# Patient Record
Sex: FEMALE | Race: WHITE | NOT HISPANIC OR LATINO | Employment: FULL TIME | URBAN - NONMETROPOLITAN AREA
[De-identification: names, ages, dates, MRNs, and addresses within clinical notes are randomized per-mention and may not be internally consistent; named-entity substitution may affect disease eponyms.]

---

## 2022-01-28 ENCOUNTER — OFFICE VISIT (OUTPATIENT)
Dept: ENDOCRINOLOGY | Facility: CLINIC | Age: 47
End: 2022-01-28

## 2022-01-28 ENCOUNTER — LAB (OUTPATIENT)
Dept: LAB | Facility: HOSPITAL | Age: 47
End: 2022-01-28

## 2022-01-28 VITALS
WEIGHT: 133 LBS | BODY MASS INDEX: 22.16 KG/M2 | DIASTOLIC BLOOD PRESSURE: 68 MMHG | OXYGEN SATURATION: 98 % | SYSTOLIC BLOOD PRESSURE: 126 MMHG | HEART RATE: 85 BPM | HEIGHT: 65 IN

## 2022-01-28 DIAGNOSIS — R94.6 ABNORMAL THYROID FUNCTION TEST: Primary | ICD-10-CM

## 2022-01-28 DIAGNOSIS — F41.9 ANXIETY: ICD-10-CM

## 2022-01-28 LAB
ALBUMIN SERPL-MCNC: 5.2 G/DL (ref 3.5–5.2)
ALBUMIN/GLOB SERPL: 1.7 G/DL
ALP SERPL-CCNC: 70 U/L (ref 39–117)
ALT SERPL W P-5'-P-CCNC: 18 U/L (ref 1–33)
ANION GAP SERPL CALCULATED.3IONS-SCNC: 10 MMOL/L (ref 5–15)
AST SERPL-CCNC: 24 U/L (ref 1–32)
BASOPHILS # BLD AUTO: 0.1 10*3/MM3 (ref 0–0.2)
BASOPHILS NFR BLD AUTO: 1.5 % (ref 0–1.5)
BILIRUB SERPL-MCNC: 0.8 MG/DL (ref 0–1.2)
BUN SERPL-MCNC: 11 MG/DL (ref 6–20)
BUN/CREAT SERPL: 15.5 (ref 7–25)
CALCIUM SPEC-SCNC: 10.6 MG/DL (ref 8.6–10.5)
CHLORIDE SERPL-SCNC: 100 MMOL/L (ref 98–107)
CO2 SERPL-SCNC: 29 MMOL/L (ref 22–29)
CREAT SERPL-MCNC: 0.71 MG/DL (ref 0.57–1)
DEPRECATED RDW RBC AUTO: 38.8 FL (ref 37–54)
EOSINOPHIL # BLD AUTO: 0.13 10*3/MM3 (ref 0–0.4)
EOSINOPHIL NFR BLD AUTO: 2 % (ref 0.3–6.2)
ERYTHROCYTE [DISTWIDTH] IN BLOOD BY AUTOMATED COUNT: 12.1 % (ref 12.3–15.4)
GFR SERPL CREATININE-BSD FRML MDRD: 89 ML/MIN/1.73
GLOBULIN UR ELPH-MCNC: 3 GM/DL
GLUCOSE SERPL-MCNC: 96 MG/DL (ref 65–99)
HCT VFR BLD AUTO: 41.5 % (ref 34–46.6)
HGB BLD-MCNC: 14.4 G/DL (ref 12–15.9)
IMM GRANULOCYTES # BLD AUTO: 0.03 10*3/MM3 (ref 0–0.05)
IMM GRANULOCYTES NFR BLD AUTO: 0.5 % (ref 0–0.5)
LYMPHOCYTES # BLD AUTO: 2.41 10*3/MM3 (ref 0.7–3.1)
LYMPHOCYTES NFR BLD AUTO: 36.3 % (ref 19.6–45.3)
MCH RBC QN AUTO: 30.6 PG (ref 26.6–33)
MCHC RBC AUTO-ENTMCNC: 34.7 G/DL (ref 31.5–35.7)
MCV RBC AUTO: 88.3 FL (ref 79–97)
MONOCYTES # BLD AUTO: 0.44 10*3/MM3 (ref 0.1–0.9)
MONOCYTES NFR BLD AUTO: 6.6 % (ref 5–12)
NEUTROPHILS NFR BLD AUTO: 3.53 10*3/MM3 (ref 1.7–7)
NEUTROPHILS NFR BLD AUTO: 53.1 % (ref 42.7–76)
NRBC BLD AUTO-RTO: 0 /100 WBC (ref 0–0.2)
PLATELET # BLD AUTO: 323 10*3/MM3 (ref 140–450)
PMV BLD AUTO: 10.1 FL (ref 6–12)
POTASSIUM SERPL-SCNC: 4.1 MMOL/L (ref 3.5–5.2)
PROT SERPL-MCNC: 8.2 G/DL (ref 6–8.5)
RBC # BLD AUTO: 4.7 10*6/MM3 (ref 3.77–5.28)
SODIUM SERPL-SCNC: 139 MMOL/L (ref 136–145)
T4 FREE SERPL-MCNC: 1.57 NG/DL (ref 0.93–1.7)
TSH SERPL DL<=0.05 MIU/L-ACNC: 0.01 UIU/ML (ref 0.27–4.2)
WBC NRBC COR # BLD: 6.64 10*3/MM3 (ref 3.4–10.8)

## 2022-01-28 PROCEDURE — 83520 IMMUNOASSAY QUANT NOS NONAB: CPT | Performed by: NURSE PRACTITIONER

## 2022-01-28 PROCEDURE — 84445 ASSAY OF TSI GLOBULIN: CPT | Performed by: NURSE PRACTITIONER

## 2022-01-28 PROCEDURE — 84439 ASSAY OF FREE THYROXINE: CPT | Performed by: NURSE PRACTITIONER

## 2022-01-28 PROCEDURE — 36415 COLL VENOUS BLD VENIPUNCTURE: CPT | Performed by: NURSE PRACTITIONER

## 2022-01-28 PROCEDURE — 84480 ASSAY TRIIODOTHYRONINE (T3): CPT | Performed by: NURSE PRACTITIONER

## 2022-01-28 PROCEDURE — 99204 OFFICE O/P NEW MOD 45 MIN: CPT | Performed by: NURSE PRACTITIONER

## 2022-01-28 PROCEDURE — 86376 MICROSOMAL ANTIBODY EACH: CPT | Performed by: NURSE PRACTITIONER

## 2022-01-28 PROCEDURE — 80050 GENERAL HEALTH PANEL: CPT | Performed by: NURSE PRACTITIONER

## 2022-01-28 NOTE — PATIENT INSTRUCTIONS
Hyperthyroidism    Hyperthyroidism is when the thyroid gland is too active (overactive). The thyroid gland is a small gland located in the lower front part of the neck, just in front of the windpipe (trachea). This gland makes hormones that help control how the body uses food for energy (metabolism) as well as how the heart and brain function. These hormones also play a role in keeping your bones strong. When the thyroid is overactive, it produces too much of a hormone called thyroxine.  What are the causes?  This condition may be caused by:  · Graves' disease. This is a disorder in which the body's disease-fighting system (immune system) attacks the thyroid gland. This is the most common cause.  · Inflammation of the thyroid gland.  · A tumor in the thyroid gland.  · Use of certain medicines, including:  ? Prescription thyroid hormone replacement.  ? Herbal supplements that mimic thyroid hormones.  ? Amiodarone therapy.  · Solid or fluid-filled lumps within your thyroid gland (thyroid nodules).  · Taking in a large amount of iodine from foods or medicines.  What increases the risk?  You are more likely to develop this condition if:  · You are female.  · You have a family history of thyroid conditions.  · You smoke tobacco.  · You use a medicine called lithium.  · You take medicines that affect the immune system (immunosuppressants).  What are the signs or symptoms?  Symptoms of this condition include:  · Nervousness.  · Inability to tolerate heat.  · Unexplained weight loss.  · Diarrhea.  · Change in the texture of hair or skin.  · Heart skipping beats or making extra beats.  · Rapid heart rate.  · Loss of menstruation.  · Shaky hands.  · Fatigue.  · Restlessness.  · Sleep problems.  · Enlarged thyroid gland or a lump in the thyroid (nodule).  You may also have symptoms of Graves' disease, which may include:  · Protruding eyes.  · Dry eyes.  · Red or swollen eyes.  · Problems with vision.  How is this  diagnosed?  This condition may be diagnosed based on:  · Your symptoms and medical history.  · A physical exam.  · Blood tests.  · Thyroid ultrasound. This test involves using sound waves to produce images of the thyroid gland.  · A thyroid scan. A radioactive substance is injected into a vein, and images show how much iodine is present in the thyroid.  · Radioactive iodine uptake test (RAIU). A small amount of radioactive iodine is given by mouth to see how much iodine the thyroid absorbs after a certain amount of time.  How is this treated?  Treatment depends on the cause and severity of the condition. Treatment may include:  · Medicines to reduce the amount of thyroid hormone your body makes.  · Radioactive iodine treatment (radioiodine therapy). This involves swallowing a small dose of radioactive iodine, in capsule or liquid form, to kill thyroid cells.  · Surgery to remove part or all of your thyroid gland. You may need to take thyroid hormone replacement medicine for the rest of your life after thyroid surgery.  · Medicines to help manage your symptoms.  Follow these instructions at home:    · Take over-the-counter and prescription medicines only as told by your health care provider.  · Do not use any products that contain nicotine or tobacco, such as cigarettes and e-cigarettes. If you need help quitting, ask your health care provider.  · Follow any instructions from your health care provider about diet. You may be instructed to limit foods that contain iodine.  · Keep all follow-up visits as told by your health care provider. This is important.  ? You will need to have blood tests regularly so that your health care provider can monitor your condition.  Contact a health care provider if:  · Your symptoms do not get better with treatment.  · You have a fever.  · You are taking thyroid hormone replacement medicine and you:  ? Have symptoms of depression.  ? Feel like you are tired all the time.  ? Gain  weight.  Get help right away if:  · You have chest pain.  · You have decreased alertness or a change in your awareness.  · You have abdominal pain.  · You feel dizzy.  · You have a rapid heartbeat.  · You have an irregular heartbeat.  · You have difficulty breathing.  Summary  · The thyroid gland is a small gland located in the lower front part of the neck, just in front of the windpipe (trachea).  · Hyperthyroidism is when the thyroid gland is too active (overactive) and produces too much of a hormone called thyroxine.  · The most common cause is Graves' disease, a disorder in which your immune system attacks the thyroid gland.  · Hyperthyroidism can cause various symptoms, such as unexplained weight loss, nervousness, inability to tolerate heat, or changes in your heartbeat.  · Treatment may include medicine to reduce the amount of thyroid hormone your body makes, radioiodine therapy, surgery, or medicines to manage symptoms.  This information is not intended to replace advice given to you by your health care provider. Make sure you discuss any questions you have with your health care provider.  Document Revised: 11/30/2018 Document Reviewed: 11/28/2018  [employer] Patient Education © 2021 [employer]

## 2022-01-28 NOTE — PROGRESS NOTES
"Chief Complaint  Hyperthyroidism and Thyroid Problem    Subjective          Elodia Taylor presents to [facility] ENDOCRINOLOGY  History of Present Illness         Chief Complaint   Patient presents with   • Hyperthyroidism   • Thyroid Problem       HPI    47 year old female presents for consultation         Reason -- abnormal thyroid function       Duration first abnormal in Dec. 2021    Context --- routine lab work     Timing--- constant     Quality  --not controlled      Severity ---moderate     No results found for: TSH    Component      Latest Ref Rng & Units 10/4/2021   Free T4      0.58 - 1.64 ng/dl 1.12   TSH, High Sensitivity      0.34 - 5.60 uIU/ml 0.01 (L)         Symptoms -- anxiety, hair loss, elevated heart rate       No current medications         Review of Systems - General ROS: negative          Objective   Vital Signs:   /68   Pulse 85   Ht 165.1 cm (65\")   Wt 60.3 kg (133 lb)   SpO2 98%   BMI 22.13 kg/m²     Physical Exam  Constitutional:       Appearance: Normal appearance.   Cardiovascular:      Rate and Rhythm: Regular rhythm.      Heart sounds: Normal heart sounds.   Pulmonary:      Breath sounds: Normal breath sounds.   Musculoskeletal:      Cervical back: Normal range of motion and neck supple.   Neurological:      General: No focal deficit present.      Mental Status: She is alert.   Psychiatric:         Mood and Affect: Mood normal.         Thought Content: Thought content normal.         Judgment: Judgment normal.        Result Review :   The following data was reviewed by: ALEXANDRE Reese on 01/28/2022:  Common labs    Common Labsle 11/16/21   Glucose 89                     Assessment and Plan    Diagnoses and all orders for this visit:    1. Abnormal thyroid function test (Primary)  -     T4, Free  -     TSH  -     T3  -     CBC & Differential  -     Comprehensive Metabolic Panel  -     Thyroid Peroxidase Antibody  -     Thyroid " Stimulating Immunoglobulin  -     Thyrotropin Receptor Antibody    2. Anxiety               Thyroid Health    Abnormal thyroid function test     Component      Latest Ref Rng & Units 10/4/2021   Free T4      0.58 - 1.64 ng/dl 1.12   TSH, High Sensitivity      0.34 - 5.60 uIU/ml 0.01 (L)       reassess today with TSH, Free T4, T3 , TSI and TPO ab      Will call back with results     Discussed treatment for hyperthyroid     Including treatment with methimazole         Anxiety     Does not need any medications         Weight Management:      Patient's Body mass index is 22.13 kg/m². indicating that she is within normal range (BMI 18.5-24.9). No BMI management plan needed..        Preventive Care:     Non smoker       Records from  reviewed form 2021  Thank you for this consultation           Follow Up   Return in about 8 weeks (around 3/25/2022) for Recheck.  Patient was given instructions and counseling regarding her condition or for health maintenance advice. Please see specific information pulled into the AVS if appropriate.         This document has been electronically signed by ALEXANDRE Reese on January 28, 2022 10:44 CST.

## 2022-01-29 LAB — T3 SERPL-MCNC: 115 NG/DL (ref 80–200)

## 2022-01-30 LAB — THYROPEROXIDASE AB SERPL-ACNC: <8 IU/ML (ref 0–34)

## 2022-01-31 LAB
TSH RECEP AB SER-ACNC: 2.55 IU/L (ref 0–1.75)
TSI SER-ACNC: 1.5 IU/L (ref 0–0.55)

## 2022-02-01 ENCOUNTER — TELEPHONE (OUTPATIENT)
Dept: ENDOCRINOLOGY | Facility: CLINIC | Age: 47
End: 2022-02-01

## 2022-02-01 RX ORDER — METHIMAZOLE 5 MG/1
5 TABLET ORAL DAILY
Qty: 30 TABLET | Refills: 11 | Status: SHIPPED | OUTPATIENT
Start: 2022-02-01 | End: 2023-03-27 | Stop reason: SDUPTHER

## 2022-02-02 ENCOUNTER — TELEPHONE (OUTPATIENT)
Dept: ENDOCRINOLOGY | Facility: CLINIC | Age: 47
End: 2022-02-02

## 2022-02-07 ENCOUNTER — TELEPHONE (OUTPATIENT)
Dept: ENDOCRINOLOGY | Facility: CLINIC | Age: 47
End: 2022-02-07

## 2022-02-07 NOTE — TELEPHONE ENCOUNTER
Patient called and wanted to discuss lab results from 1/28/22 and had questions about meds that she just started.     Thanks

## 2022-03-01 ENCOUNTER — TELEPHONE (OUTPATIENT)
Dept: ENDOCRINOLOGY | Facility: CLINIC | Age: 47
End: 2022-03-01

## 2022-03-01 DIAGNOSIS — R94.6 ABNORMAL THYROID FUNCTION TEST: Primary | ICD-10-CM

## 2022-03-07 ENCOUNTER — LAB (OUTPATIENT)
Dept: LAB | Facility: HOSPITAL | Age: 47
End: 2022-03-07

## 2022-03-07 LAB
ALBUMIN SERPL-MCNC: 4.9 G/DL (ref 3.5–5.2)
ALBUMIN/GLOB SERPL: 1.5 G/DL
ALP SERPL-CCNC: 68 U/L (ref 39–117)
ALT SERPL W P-5'-P-CCNC: 20 U/L (ref 1–33)
ANION GAP SERPL CALCULATED.3IONS-SCNC: 10 MMOL/L (ref 5–15)
AST SERPL-CCNC: 26 U/L (ref 1–32)
BASOPHILS # BLD AUTO: 0.1 10*3/MM3 (ref 0–0.2)
BASOPHILS NFR BLD AUTO: 1.9 % (ref 0–1.5)
BILIRUB SERPL-MCNC: 0.8 MG/DL (ref 0–1.2)
BUN SERPL-MCNC: 11 MG/DL (ref 6–20)
BUN/CREAT SERPL: 13.6 (ref 7–25)
CALCIUM SPEC-SCNC: 9.9 MG/DL (ref 8.6–10.5)
CHLORIDE SERPL-SCNC: 98 MMOL/L (ref 98–107)
CO2 SERPL-SCNC: 29 MMOL/L (ref 22–29)
CREAT SERPL-MCNC: 0.81 MG/DL (ref 0.57–1)
DEPRECATED RDW RBC AUTO: 40.2 FL (ref 37–54)
EGFRCR SERPLBLD CKD-EPI 2021: 91.4 ML/MIN/1.73
EOSINOPHIL # BLD AUTO: 0.17 10*3/MM3 (ref 0–0.4)
EOSINOPHIL NFR BLD AUTO: 3.3 % (ref 0.3–6.2)
ERYTHROCYTE [DISTWIDTH] IN BLOOD BY AUTOMATED COUNT: 12.3 % (ref 12.3–15.4)
GLOBULIN UR ELPH-MCNC: 3.3 GM/DL
GLUCOSE SERPL-MCNC: 97 MG/DL (ref 65–99)
HCT VFR BLD AUTO: 40.7 % (ref 34–46.6)
HGB BLD-MCNC: 14 G/DL (ref 12–15.9)
IMM GRANULOCYTES # BLD AUTO: 0.02 10*3/MM3 (ref 0–0.05)
IMM GRANULOCYTES NFR BLD AUTO: 0.4 % (ref 0–0.5)
LYMPHOCYTES # BLD AUTO: 2.06 10*3/MM3 (ref 0.7–3.1)
LYMPHOCYTES NFR BLD AUTO: 39.9 % (ref 19.6–45.3)
MCH RBC QN AUTO: 30.6 PG (ref 26.6–33)
MCHC RBC AUTO-ENTMCNC: 34.4 G/DL (ref 31.5–35.7)
MCV RBC AUTO: 89.1 FL (ref 79–97)
MONOCYTES # BLD AUTO: 0.37 10*3/MM3 (ref 0.1–0.9)
MONOCYTES NFR BLD AUTO: 7.2 % (ref 5–12)
NEUTROPHILS NFR BLD AUTO: 2.44 10*3/MM3 (ref 1.7–7)
NEUTROPHILS NFR BLD AUTO: 47.3 % (ref 42.7–76)
NRBC BLD AUTO-RTO: 0 /100 WBC (ref 0–0.2)
PLATELET # BLD AUTO: 304 10*3/MM3 (ref 140–450)
PMV BLD AUTO: 10.3 FL (ref 6–12)
POTASSIUM SERPL-SCNC: 4.5 MMOL/L (ref 3.5–5.2)
PROT SERPL-MCNC: 8.2 G/DL (ref 6–8.5)
RBC # BLD AUTO: 4.57 10*6/MM3 (ref 3.77–5.28)
SODIUM SERPL-SCNC: 137 MMOL/L (ref 136–145)
T3 SERPL-MCNC: 115 NG/DL (ref 80–200)
T4 FREE SERPL-MCNC: 1.26 NG/DL (ref 0.93–1.7)
TSH SERPL DL<=0.05 MIU/L-ACNC: 0.03 UIU/ML (ref 0.27–4.2)
WBC NRBC COR # BLD: 5.16 10*3/MM3 (ref 3.4–10.8)

## 2022-03-07 PROCEDURE — 84439 ASSAY OF FREE THYROXINE: CPT | Performed by: NURSE PRACTITIONER

## 2022-03-07 PROCEDURE — 84480 ASSAY TRIIODOTHYRONINE (T3): CPT | Performed by: NURSE PRACTITIONER

## 2022-03-07 PROCEDURE — 80050 GENERAL HEALTH PANEL: CPT | Performed by: NURSE PRACTITIONER

## 2022-03-07 PROCEDURE — 36415 COLL VENOUS BLD VENIPUNCTURE: CPT | Performed by: NURSE PRACTITIONER

## 2022-03-25 ENCOUNTER — OFFICE VISIT (OUTPATIENT)
Dept: ENDOCRINOLOGY | Facility: CLINIC | Age: 47
End: 2022-03-25

## 2022-03-25 VITALS
SYSTOLIC BLOOD PRESSURE: 126 MMHG | WEIGHT: 133.3 LBS | HEIGHT: 65 IN | HEART RATE: 87 BPM | OXYGEN SATURATION: 100 % | BODY MASS INDEX: 22.21 KG/M2 | DIASTOLIC BLOOD PRESSURE: 86 MMHG

## 2022-03-25 DIAGNOSIS — E05.90 HYPERTHYROIDISM: Primary | ICD-10-CM

## 2022-03-25 DIAGNOSIS — E05.00 GRAVES DISEASE: ICD-10-CM

## 2022-03-25 PROCEDURE — 99213 OFFICE O/P EST LOW 20 MIN: CPT | Performed by: NURSE PRACTITIONER

## 2022-03-25 NOTE — PROGRESS NOTES
"Chief Complaint  Thyroid Problem    Subjective          Elodia Taylor presents to [facility] ENDOCRINOLOGY  History of Present Illness     In office visit     47 year old female presents for follow up     Reason hyperthyroidism due to Graves      Duration first abnormal in Dec. 2021     Context --- routine lab work      Timing--- constant      Quality  --not controlled        Severity ---moderate     Taking methimazole       Review of Systems - General ROS: negative                  Objective   Vital Signs:   /86   Pulse 87   Ht 165.1 cm (65\")   Wt 60.5 kg (133 lb 4.8 oz)   SpO2 100%   BMI 22.18 kg/m²     Physical Exam  Constitutional:       Appearance: Normal appearance.   Cardiovascular:      Rate and Rhythm: Regular rhythm.      Heart sounds: Normal heart sounds.   Pulmonary:      Breath sounds: Normal breath sounds.   Musculoskeletal:      Cervical back: Normal range of motion and neck supple.   Neurological:      Mental Status: She is alert.        Result Review :   The following data was reviewed by: ALEXANDRE Reese on 03/25/2022:  Common labs    Common Labsle 11/16/21 11/16/21 1/28/22 1/28/22 3/7/22 3/7/22    1002 1002 1046 1046 0950 0950   Glucose    96  97   Glucose  89       BUN    11  11   Creatinine    0.71  0.81   eGFR Non African Am    89     Sodium    139  137   Potassium    4.1  4.5   Chloride    100  98   Calcium    10.6 (A)  9.9   Albumin    5.20  4.90   Total Bilirubin    0.8  0.8   Alkaline Phosphatase    70  68   AST (SGOT)    24  26   ALT (SGPT)    18  20   WBC   6.64  5.16    Hemoglobin   14.4  14.0    Hematocrit   41.5  40.7    Platelets   323  304    Total Cholesterol 153        Triglycerides 55        HDL Cholesterol 65        LDL Cholesterol  77        (A) Abnormal value                      Assessment and Plan    Diagnoses and all orders for this visit:    1. Hyperthyroidism (Primary)  -     CBC & Differential; Future  -     Comprehensive " Metabolic Panel; Future  -     TSH; Future  -     T4, Free; Future  -     T3; Future           Thyroid Health      Hyperthyroidism due to Graves         Component      Latest Ref Rng & Units 1/28/2022   Thyroid Peroxidase Antibody      0 - 34 IU/mL <8   Thyroid Stimulating Immunoglobulin      0.00 - 0.55 IU/L 1.50 (H)   Thyrotropin Receptor Antibody      0.00 - 1.75 IU/L 2.55 (H)       Component      Latest Ref Rng & Units 3/7/2022   Free T4      0.93 - 1.70 ng/dL 1.26   TSH Baseline      0.270 - 4.200 uIU/mL 0.025 (L)   T3, Total      80.0 - 200.0 ng/dl 115.0     Discussed treatment for hyperthyroid      Including treatment with methimazole                      Weight Management:     Patient's Body mass index is 22.18 kg/m². indicating that she is within normal range (BMI 18.5-24.9). No BMI management plan needed..          Preventive Care:      Non smoker                Follow Up   Return in about 3 months (around 6/25/2022) for Recheck.  Patient was given instructions and counseling regarding her condition or for health maintenance advice. Please see specific information pulled into the AVS if appropriate.         This document has been electronically signed by ALEXANDRE Reese on March 25, 2022 11:23 CDT.

## 2022-05-03 DIAGNOSIS — E05.90 HYPERTHYROIDISM: ICD-10-CM

## 2022-05-04 ENCOUNTER — TELEPHONE (OUTPATIENT)
Dept: ENDOCRINOLOGY | Facility: CLINIC | Age: 47
End: 2022-05-04

## 2022-05-04 NOTE — TELEPHONE ENCOUNTER
Pt is wanting lab results from [facility] on 5/2. Those are scanned into media. Please call pt to advise.    Thanks

## 2022-06-29 ENCOUNTER — OFFICE VISIT (OUTPATIENT)
Dept: ENDOCRINOLOGY | Facility: CLINIC | Age: 47
End: 2022-06-29

## 2022-06-29 VITALS
WEIGHT: 136.7 LBS | SYSTOLIC BLOOD PRESSURE: 126 MMHG | BODY MASS INDEX: 22.78 KG/M2 | DIASTOLIC BLOOD PRESSURE: 82 MMHG | HEART RATE: 87 BPM | HEIGHT: 65 IN | OXYGEN SATURATION: 99 %

## 2022-06-29 DIAGNOSIS — E05.00 GRAVES DISEASE: Primary | ICD-10-CM

## 2022-06-29 DIAGNOSIS — E05.90 HYPERTHYROIDISM: ICD-10-CM

## 2022-06-29 PROCEDURE — 99213 OFFICE O/P EST LOW 20 MIN: CPT | Performed by: NURSE PRACTITIONER

## 2022-06-29 NOTE — PROGRESS NOTES
"Chief Complaint  Thyroid Problem    Subjective          Elodia Taylor presents to [facility] ENDOCRINOLOGY  History of Present Illness       In office visit     47 year old female presents for follow up     Reason hyperthyroidism due to Graves      Duration first abnormal in Dec. 2021     Context --- routine lab work      Timing--- constant      Quality  --controlled         Severity ---moderate     Taking methimazole         Review of Systems - General ROS: negative           Objective   Vital Signs:   /82   Pulse 87   Ht 165.1 cm (65\")   Wt 62 kg (136 lb 11.2 oz)   SpO2 99%   BMI 22.75 kg/m²     Physical Exam  Constitutional:       Appearance: Normal appearance.   Cardiovascular:      Rate and Rhythm: Regular rhythm.      Heart sounds: Normal heart sounds.   Pulmonary:      Breath sounds: Normal breath sounds.   Musculoskeletal:      Cervical back: Normal range of motion and neck supple.   Neurological:      Mental Status: She is alert.        Result Review :   The following data was reviewed by: ALEXANDRE Reese on 03/25/2022:  Common labs    Common Labsle 3/7/22 3/7/22 5/2/22 6/13/22    0950 0950     Glucose  97     BUN  11     Creatinine  0.81     Sodium  137     Potassium  4.5     Chloride  98     Calcium  9.9     Albumin  4.90     Total Bilirubin  0.8     Alkaline Phosphatase  68     AST (SGOT)  26     ALT (SGPT)  20     WBC 5.16  5.1 5.1   Hemoglobin 14.0  13.2 13.0   Hematocrit 40.7  39.8 39.0   Platelets 304  277 240                       Assessment and Plan    Diagnoses and all orders for this visit:    1. Graves disease (Primary)  -     CBC & Differential; Future  -     Comprehensive Metabolic Panel; Future  -     TSH; Future  -     T4, Free; Future  -     T3; Future  -     T4, Free; Future  -     TSH; Future  -     T3; Future  -     CBC & Differential; Future  -     Comprehensive Metabolic Panel; Future    2. Hyperthyroidism  -     CBC & Differential; " Future  -     Comprehensive Metabolic Panel; Future  -     TSH; Future  -     T4, Free; Future  -     T3; Future  -     T4, Free; Future  -     TSH; Future  -     T3; Future  -     CBC & Differential; Future  -     Comprehensive Metabolic Panel; Future           Thyroid Health      Hyperthyroidism due to Graves         Component      Latest Ref Rng & Units 1/28/2022   Thyroid Peroxidase Antibody      0 - 34 IU/mL <8   Thyroid Stimulating Immunoglobulin      0.00 - 0.55 IU/L 1.50 (H)   Thyrotropin Receptor Antibody      0.00 - 1.75 IU/L 2.55 (H)         Discussed treatment for hyperthyroid      Including treatment with methimazole         Component      Latest Ref Rng & Units 6/13/2022   TSH, High Sensitivity      0.55 - 4.78 uIU/ML 3.16       T3 -  0.92          Taking 2.5 mg methimazole           Weight Management:     Patient's Body mass index is 22.75 kg/m². indicating that she is within normal range (BMI 18.5-24.9). No BMI management plan needed..          Preventive Care:      Non smoker                Follow Up   Return in about 3 months (around 9/29/2022) for Recheck.  Patient was given instructions and counseling regarding her condition or for health maintenance advice. Please see specific information pulled into the AVS if appropriate.         This document has been electronically signed by ALEXANDRE Reese on June 29, 2022 08:59 CDT.

## 2022-10-03 ENCOUNTER — LAB (OUTPATIENT)
Dept: LAB | Facility: HOSPITAL | Age: 47
End: 2022-10-03

## 2022-10-03 DIAGNOSIS — E05.00 GRAVES DISEASE: ICD-10-CM

## 2022-10-03 DIAGNOSIS — E05.90 HYPERTHYROIDISM: ICD-10-CM

## 2022-10-03 LAB
ALBUMIN SERPL-MCNC: 4.6 G/DL (ref 3.5–5.2)
ALBUMIN/GLOB SERPL: 1.5 G/DL
ALP SERPL-CCNC: 68 U/L (ref 39–117)
ALT SERPL W P-5'-P-CCNC: 15 U/L (ref 1–33)
ANION GAP SERPL CALCULATED.3IONS-SCNC: 9 MMOL/L (ref 5–15)
AST SERPL-CCNC: 21 U/L (ref 1–32)
BASOPHILS # BLD AUTO: 0.08 10*3/MM3 (ref 0–0.2)
BASOPHILS NFR BLD AUTO: 1.5 % (ref 0–1.5)
BILIRUB SERPL-MCNC: 0.5 MG/DL (ref 0–1.2)
BUN SERPL-MCNC: 12 MG/DL (ref 6–20)
BUN/CREAT SERPL: 14.8 (ref 7–25)
CALCIUM SPEC-SCNC: 9.5 MG/DL (ref 8.6–10.5)
CHLORIDE SERPL-SCNC: 101 MMOL/L (ref 98–107)
CO2 SERPL-SCNC: 31 MMOL/L (ref 22–29)
CREAT SERPL-MCNC: 0.81 MG/DL (ref 0.57–1)
DEPRECATED RDW RBC AUTO: 40.6 FL (ref 37–54)
EGFRCR SERPLBLD CKD-EPI 2021: 91.4 ML/MIN/1.73
EOSINOPHIL # BLD AUTO: 0.19 10*3/MM3 (ref 0–0.4)
EOSINOPHIL NFR BLD AUTO: 3.5 % (ref 0.3–6.2)
ERYTHROCYTE [DISTWIDTH] IN BLOOD BY AUTOMATED COUNT: 12.1 % (ref 12.3–15.4)
GLOBULIN UR ELPH-MCNC: 3.1 GM/DL
GLUCOSE SERPL-MCNC: 55 MG/DL (ref 65–99)
HCT VFR BLD AUTO: 40.1 % (ref 34–46.6)
HGB BLD-MCNC: 13.4 G/DL (ref 12–15.9)
IMM GRANULOCYTES # BLD AUTO: 0.02 10*3/MM3 (ref 0–0.05)
IMM GRANULOCYTES NFR BLD AUTO: 0.4 % (ref 0–0.5)
LYMPHOCYTES # BLD AUTO: 2.21 10*3/MM3 (ref 0.7–3.1)
LYMPHOCYTES NFR BLD AUTO: 40.3 % (ref 19.6–45.3)
MCH RBC QN AUTO: 30.6 PG (ref 26.6–33)
MCHC RBC AUTO-ENTMCNC: 33.4 G/DL (ref 31.5–35.7)
MCV RBC AUTO: 91.6 FL (ref 79–97)
MONOCYTES # BLD AUTO: 0.4 10*3/MM3 (ref 0.1–0.9)
MONOCYTES NFR BLD AUTO: 7.3 % (ref 5–12)
NEUTROPHILS NFR BLD AUTO: 2.58 10*3/MM3 (ref 1.7–7)
NEUTROPHILS NFR BLD AUTO: 47 % (ref 42.7–76)
NRBC BLD AUTO-RTO: 0 /100 WBC (ref 0–0.2)
PLATELET # BLD AUTO: 273 10*3/MM3 (ref 140–450)
PMV BLD AUTO: 9.9 FL (ref 6–12)
POTASSIUM SERPL-SCNC: 4.2 MMOL/L (ref 3.5–5.2)
PROT SERPL-MCNC: 7.7 G/DL (ref 6–8.5)
RBC # BLD AUTO: 4.38 10*6/MM3 (ref 3.77–5.28)
SODIUM SERPL-SCNC: 141 MMOL/L (ref 136–145)
T3 SERPL-MCNC: 90.8 NG/DL (ref 80–200)
T4 FREE SERPL-MCNC: 1.11 NG/DL (ref 0.93–1.7)
TSH SERPL DL<=0.05 MIU/L-ACNC: 3.54 UIU/ML (ref 0.27–4.2)
WBC NRBC COR # BLD: 5.48 10*3/MM3 (ref 3.4–10.8)

## 2022-10-03 PROCEDURE — 84439 ASSAY OF FREE THYROXINE: CPT

## 2022-10-03 PROCEDURE — 36415 COLL VENOUS BLD VENIPUNCTURE: CPT

## 2022-10-03 PROCEDURE — 84480 ASSAY TRIIODOTHYRONINE (T3): CPT

## 2022-10-03 PROCEDURE — 80050 GENERAL HEALTH PANEL: CPT

## 2022-10-04 ENCOUNTER — TELEPHONE (OUTPATIENT)
Dept: ENDOCRINOLOGY | Facility: CLINIC | Age: 47
End: 2022-10-04

## 2022-10-04 NOTE — TELEPHONE ENCOUNTER
Patient called, wanting to speak to a nurse concerning her recent labs.    Her callback number is [phone].    Thanks

## 2022-10-06 ENCOUNTER — OFFICE VISIT (OUTPATIENT)
Dept: ENDOCRINOLOGY | Facility: CLINIC | Age: 47
End: 2022-10-06

## 2022-10-06 VITALS
DIASTOLIC BLOOD PRESSURE: 66 MMHG | RESPIRATION RATE: 18 BRPM | HEIGHT: 65 IN | OXYGEN SATURATION: 99 % | SYSTOLIC BLOOD PRESSURE: 122 MMHG | BODY MASS INDEX: 22.23 KG/M2 | HEART RATE: 68 BPM | WEIGHT: 133.4 LBS

## 2022-10-06 DIAGNOSIS — E05.90 HYPERTHYROIDISM: Primary | ICD-10-CM

## 2022-10-06 DIAGNOSIS — F41.9 ANXIETY: ICD-10-CM

## 2022-10-06 DIAGNOSIS — E05.00 GRAVES DISEASE: ICD-10-CM

## 2022-10-06 PROCEDURE — 99213 OFFICE O/P EST LOW 20 MIN: CPT | Performed by: NURSE PRACTITIONER

## 2022-10-06 RX ORDER — DIPHENOXYLATE HYDROCHLORIDE AND ATROPINE SULFATE 2.5; .025 MG/1; MG/1
1 TABLET ORAL DAILY
COMMUNITY

## 2022-10-06 NOTE — PROGRESS NOTES
"Chief Complaint  Follow-up and thyroid (3 mo)    Subjective          Elodia Taylor presents to [facility] ENDOCRINOLOGY  Thyroid Problem         In office visit     47 year old female presents for follow up     Reason hyperthyroidism due to Graves      Duration first abnormal in Dec. 2021     Context --- routine lab work      Timing--- constant      Quality  --controlled         Severity ---moderate     Taking methimazole     Symptoms none            Review of Systems - General ROS: negative      Objective   Vital Signs:   /66   Pulse 68   Resp 18   Ht 165.1 cm (65\")   Wt 60.5 kg (133 lb 6.4 oz)   SpO2 99%   BMI 22.20 kg/m²     Physical Exam  Constitutional:       Appearance: Normal appearance.   Cardiovascular:      Rate and Rhythm: Regular rhythm.      Heart sounds: Normal heart sounds.   Pulmonary:      Breath sounds: Normal breath sounds.   Musculoskeletal:      Cervical back: Normal range of motion and neck supple.   Neurological:      Mental Status: She is alert.        Result Review :   The following data was reviewed by: ALEXANDRE Reese on 03/25/2022:  Common labs    Common Labs 5/2/22 6/13/22 10/3/22 10/3/22      0849 0849   Glucose    55 (A)   BUN    12   Creatinine    0.81   Sodium    141   Potassium    4.2   Chloride    101   Calcium    9.5   Albumin    4.60   Total Bilirubin    0.5   Alkaline Phosphatase    68   AST (SGOT)    21   ALT (SGPT)    15   WBC 5.1 5.1 5.48    Hemoglobin 13.2 13.0 13.4    Hematocrit 39.8 39.0 40.1    Platelets 277 240 273    (A) Abnormal value                        Assessment and Plan    Diagnoses and all orders for this visit:    1. Hyperthyroidism (Primary)  -     CBC & Differential; Future  -     Comprehensive Metabolic Panel; Future  -     TSH; Future  -     T4, Free; Future  -     T3; Future    2. Graves disease  -     CBC & Differential; Future  -     Comprehensive Metabolic Panel; Future  -     TSH; Future  -     T4, " Free; Future  -     T3; Future    3. Anxiety           Thyroid Health      Hyperthyroidism due to Graves         Component      Latest Ref Rng & Units 1/28/2022   Thyroid Peroxidase Antibody      0 - 34 IU/mL <8   Thyroid Stimulating Immunoglobulin      0.00 - 0.55 IU/L 1.50 (H)   Thyrotropin Receptor Antibody      0.00 - 1.75 IU/L 2.55 (H)         Discussed treatment for hyperthyroid      Including treatment with methimazole         Component      Latest Ref Rng & Units 6/13/2022   TSH, High Sensitivity      0.55 - 4.78 uIU/ML 3.16        T3 -  0.92          Taking 2.5 mg methimazole         Lab Results   Component Value Date    TSH 3.540 10/03/2022     Component      Latest Ref Rng & Units 10/3/2022   Free T4      0.93 - 1.70 ng/dL 1.11   T3, Total      80.0 - 200.0 ng/dl 90.8             Weight Management:     Patient's Body mass index is 22.2 kg/m². indicating that she is within normal range (BMI 18.5-24.9). No BMI management plan needed..          Preventive Care:      Non smoker        Anxiety     States improved             Follow Up   No follow-ups on file.  Patient was given instructions and counseling regarding her condition or for health maintenance advice. Please see specific information pulled into the AVS if appropriate.         This document has been electronically signed by ALEXANDRE Reese on October 6, 2022 09:18 CDT.

## 2023-01-24 ENCOUNTER — OFFICE VISIT (OUTPATIENT)
Dept: ENDOCRINOLOGY | Facility: CLINIC | Age: 48
End: 2023-01-24
Payer: COMMERCIAL

## 2023-01-24 VITALS
HEIGHT: 65 IN | HEART RATE: 51 BPM | WEIGHT: 136.3 LBS | OXYGEN SATURATION: 99 % | DIASTOLIC BLOOD PRESSURE: 90 MMHG | SYSTOLIC BLOOD PRESSURE: 130 MMHG | BODY MASS INDEX: 22.71 KG/M2

## 2023-01-24 DIAGNOSIS — E55.9 VITAMIN D DEFICIENCY: ICD-10-CM

## 2023-01-24 DIAGNOSIS — E05.90 HYPERTHYROIDISM: Primary | ICD-10-CM

## 2023-01-24 DIAGNOSIS — F41.9 ANXIETY: ICD-10-CM

## 2023-01-24 DIAGNOSIS — E05.00 GRAVES DISEASE: ICD-10-CM

## 2023-01-24 PROCEDURE — 99214 OFFICE O/P EST MOD 30 MIN: CPT | Performed by: NURSE PRACTITIONER

## 2023-01-24 NOTE — PROGRESS NOTES
"Chief Complaint  Hypothyroidism and Graves' Disease    Subjective          Elodia Taylor presents to [facility] ENDOCRINOLOGY  Thyroid Problem    Hypothyroidism    Graves' Disease         In office visit     48 year old female presents for follow up       Reason hyperthyroidism due to Graves      Duration first abnormal in Dec. 2021     Context --- routine lab work      Timing--- constant      Quality  --controlled         Severity ---moderate     Taking methimazole                  Review of Systems - General ROS: negative      Objective   Vital Signs:   /90   Pulse 51   Ht 165.1 cm (65\")   Wt 61.8 kg (136 lb 4.8 oz)   SpO2 99%   BMI 22.68 kg/m²     Physical Exam  Constitutional:       Appearance: Normal appearance.   Cardiovascular:      Rate and Rhythm: Regular rhythm.      Heart sounds: Normal heart sounds.   Pulmonary:      Breath sounds: Normal breath sounds.   Musculoskeletal:      Cervical back: Normal range of motion and neck supple.   Neurological:      Mental Status: She is alert.        Result Review :   The following data was reviewed by: ALEXANDRE Reese on 03/25/2022:  Common labs    Common Labs 10/3/22 10/3/22 11/16/22 11/16/22 1/16/23    0849 0849 1028 1028    Glucose  55 (A)      Glucose    93    BUN  12      Creatinine  0.81      Sodium  141      Potassium  4.2      Chloride  101      Calcium  9.5      Albumin  4.60      Total Bilirubin  0.5      Alkaline Phosphatase  68      AST (SGOT)  21      ALT (SGPT)  15      WBC 5.48    5.6   Hemoglobin 13.4    13.4   Hematocrit 40.1    40.4   Platelets 273    278   Total Cholesterol   184     Triglycerides   61     HDL Cholesterol   71     LDL Cholesterol    101     (A) Abnormal value                        Assessment and Plan    Diagnoses and all orders for this visit:    1. Hyperthyroidism (Primary)  -     CBC & Differential; Future  -     Comprehensive Metabolic Panel; Future  -     TSH; Future  -     T4, " Free; Future  -     T3; Future  -     Vitamin D,25-Hydroxy; Future  -     Vitamin B12; Future    2. Graves disease  -     CBC & Differential; Future  -     Comprehensive Metabolic Panel; Future  -     TSH; Future  -     T4, Free; Future  -     T3; Future  -     Vitamin D,25-Hydroxy; Future  -     Vitamin B12; Future    3. Anxiety  -     CBC & Differential; Future  -     Comprehensive Metabolic Panel; Future  -     TSH; Future  -     T4, Free; Future  -     T3; Future  -     Vitamin D,25-Hydroxy; Future  -     Vitamin B12; Future    4. Vitamin D deficiency  -     CBC & Differential; Future  -     Comprehensive Metabolic Panel; Future  -     TSH; Future  -     T4, Free; Future  -     T3; Future  -     Vitamin D,25-Hydroxy; Future  -     Vitamin B12; Future           Thyroid Health      Hyperthyroidism due to Graves         Component      Latest Ref Rng & Units 1/28/2022   Thyroid Peroxidase Antibody      0 - 34 IU/mL <8   Thyroid Stimulating Immunoglobulin      0.00 - 0.55 IU/L 1.50 (H)   Thyrotropin Receptor Antibody      0.00 - 1.75 IU/L 2.55 (H)         Discussed treatment for hyperthyroid      Including treatment with methimazole         Component      Latest Ref Rng & Units 6/13/2022   TSH, High Sensitivity      0.55 - 4.78 uIU/ML 3.16        T3 -  0.92          Taking 2.5 mg methimazole            Component      Latest Ref Rng & Units 1/16/2023   TSH, High Sensitivity      0.55 - 4.78 uIU/ML 2.19        Weight Management:     Patient's Body mass index is 22.68 kg/m². indicating that she is within normal range (BMI 18.5-24.9). No BMI management plan needed..          Preventive Care:      Non smoker        Anxiety       Improved       Bone health     Vitamin d def.                 Follow Up   No follow-ups on file.  Patient was given instructions and counseling regarding her condition or for health maintenance advice. Please see specific information pulled into the AVS if appropriate.         This document has  been electronically signed by ALEXANDRE Reese on January 24, 2023 14:00 CST.

## 2023-03-27 DIAGNOSIS — E05.90 HYPERTHYROIDISM: Primary | ICD-10-CM

## 2023-03-27 RX ORDER — METHIMAZOLE 5 MG/1
5 TABLET ORAL DAILY
Qty: 30 TABLET | Refills: 11 | Status: SHIPPED | OUTPATIENT
Start: 2023-03-27 | End: 2023-03-28 | Stop reason: SDUPTHER

## 2023-03-28 DIAGNOSIS — E05.90 HYPERTHYROIDISM: ICD-10-CM

## 2023-03-28 RX ORDER — METHIMAZOLE 5 MG/1
5 TABLET ORAL DAILY
Qty: 30 TABLET | Refills: 11 | Status: SHIPPED | OUTPATIENT
Start: 2023-03-28 | End: 2024-03-27

## 2023-05-09 ENCOUNTER — OFFICE VISIT (OUTPATIENT)
Dept: ENDOCRINOLOGY | Facility: CLINIC | Age: 48
End: 2023-05-09
Payer: COMMERCIAL

## 2023-05-09 VITALS
HEIGHT: 65 IN | WEIGHT: 137 LBS | OXYGEN SATURATION: 99 % | HEART RATE: 49 BPM | SYSTOLIC BLOOD PRESSURE: 130 MMHG | BODY MASS INDEX: 22.82 KG/M2 | DIASTOLIC BLOOD PRESSURE: 84 MMHG

## 2023-05-09 DIAGNOSIS — E05.90 HYPERTHYROIDISM: Primary | ICD-10-CM

## 2023-05-09 DIAGNOSIS — E05.00 GRAVES DISEASE: ICD-10-CM

## 2023-05-09 DIAGNOSIS — E55.9 VITAMIN D DEFICIENCY: ICD-10-CM

## 2023-05-09 PROCEDURE — 99214 OFFICE O/P EST MOD 30 MIN: CPT | Performed by: NURSE PRACTITIONER

## 2023-05-09 NOTE — PROGRESS NOTES
"Chief Complaint  Hyperthyroidism and Graves' Disease    Subjective          Elodia Taylor presents to [facility] ENDOCRINOLOGY  Hypothyroidism    Graves' Disease    Thyroid Problem    Hyperthyroidism         In office visit     48 year old female presents for follow up     Hyperthyroidism due to Graves       Diagnosed in Dec. 2021     Timing constant     Quality controlled    Severity moderate          Taking methimazole         Anxiety improved          Review of Systems - General ROS: negative      Objective   Vital Signs:   /84   Pulse (!) 49   Ht 165.1 cm (65\")   Wt 62.1 kg (137 lb)   SpO2 99%   BMI 22.80 kg/m²     Physical Exam  Constitutional:       Appearance: Normal appearance.   Cardiovascular:      Rate and Rhythm: Regular rhythm.      Heart sounds: Normal heart sounds.   Pulmonary:      Breath sounds: Normal breath sounds.   Musculoskeletal:      Cervical back: Normal range of motion and neck supple.   Neurological:      Mental Status: She is alert.        Result Review :   The following data was reviewed by: ALEXANDRE Reese on 03/25/2022:  Common labs        11/16/2022    09:28 1/16/2023    12:31 4/20/2023    11:24   Common Labs   Glucose 93          WBC  5.6      5.6        Hemoglobin  13.4      13.3        Hematocrit  40.4      39.5        Platelets  278      293        Total Cholesterol 184          Triglycerides 61          HDL Cholesterol 71          LDL Cholesterol  101              This result is from an external source.                 Assessment and Plan    Diagnoses and all orders for this visit:    1. Hyperthyroidism (Primary)  -     CBC & Differential; Future  -     Comprehensive Metabolic Panel; Future  -     TSH; Future  -     Vitamin B12; Future  -     Vitamin D,25-Hydroxy; Future  -     T4, Free; Future  -     T3; Future  -     Thyroid Stimulating Immunoglobulin; Future  -     Thyrotropin Receptor Antibody; Future    2. Graves disease  -   "   CBC & Differential; Future  -     Comprehensive Metabolic Panel; Future  -     TSH; Future  -     Vitamin B12; Future  -     Vitamin D,25-Hydroxy; Future  -     T4, Free; Future  -     T3; Future  -     Thyroid Stimulating Immunoglobulin; Future  -     Thyrotropin Receptor Antibody; Future    3. Vitamin D deficiency  -     CBC & Differential; Future  -     Comprehensive Metabolic Panel; Future  -     TSH; Future  -     Vitamin B12; Future  -     Vitamin D,25-Hydroxy; Future  -     T4, Free; Future  -     T3; Future  -     Thyroid Stimulating Immunoglobulin; Future  -     Thyrotropin Receptor Antibody; Future           Thyroid Health      Hyperthyroidism due to Graves         Component      Latest Ref Rng & Units 1/28/2022   Thyroid Peroxidase Antibody      0 - 34 IU/mL <8   Thyroid Stimulating Immunoglobulin      0.00 - 0.55 IU/L 1.50 (H)   Thyrotropin Receptor Antibody      0.00 - 1.75 IU/L 2.55 (H)         Discussed treatment for hyperthyroid      Including treatment with methimazole      Component      Latest Ref Rng 4/20/2023   TSH, High Sensitivity      0.55 - 4.78 uIU/ML 2.91 (E)              Component      Latest Ref Rng 4/20/2023   Free T4      0.80 - 1.76 NG/DL 1.17 (E)      (E) External lab result           Taking 2.5 mg methimazole                   Preventive Care:      Non smoker        Anxiety       Improved                 Bone health     Vitamin d def.     Taking supplement                 Follow Up   No follow-ups on file.  Patient was given instructions and counseling regarding her condition or for health maintenance advice. Please see specific information pulled into the AVS if appropriate.         This document has been electronically signed by ALEXANDRE Reese on May 9, 2023 09:12 CDT.

## 2023-08-17 ENCOUNTER — OFFICE VISIT (OUTPATIENT)
Dept: ENDOCRINOLOGY | Facility: CLINIC | Age: 48
End: 2023-08-17
Payer: COMMERCIAL

## 2023-08-17 VITALS
BODY MASS INDEX: 22.71 KG/M2 | HEART RATE: 54 BPM | OXYGEN SATURATION: 99 % | SYSTOLIC BLOOD PRESSURE: 120 MMHG | DIASTOLIC BLOOD PRESSURE: 82 MMHG | HEIGHT: 65 IN | WEIGHT: 136.3 LBS

## 2023-08-17 DIAGNOSIS — F41.9 ANXIETY: ICD-10-CM

## 2023-08-17 DIAGNOSIS — E05.00 GRAVES DISEASE: Primary | ICD-10-CM

## 2023-08-17 DIAGNOSIS — E05.90 HYPERTHYROIDISM: ICD-10-CM

## 2023-08-17 DIAGNOSIS — E55.9 VITAMIN D DEFICIENCY: ICD-10-CM

## 2023-08-17 NOTE — PROGRESS NOTES
"Chief Complaint  Hyperthyroidism    Subjective          Elodia Taylor presents to [facility] ENDOCRINOLOGY  Hyperthyroidism    Graves' Disease    Hypothyroidism    Thyroid Problem       In office visit     48 year old female presents for follow up     Hyperthyroidism due to Graves       Diagnosed in Dec. 2021     Timing constant     Quality controlled    Severity moderate          Taking methimazole         Anxiety improved     Review of Systems - General ROS: negative          Objective   Vital Signs:   /82   Pulse 54   Ht 165.1 cm (65\")   Wt 61.8 kg (136 lb 4.8 oz)   SpO2 99%   BMI 22.68 kg/mý     Physical Exam  Constitutional:       Appearance: Normal appearance.   Cardiovascular:      Rate and Rhythm: Regular rhythm.      Heart sounds: Normal heart sounds.   Pulmonary:      Breath sounds: Normal breath sounds.   Musculoskeletal:      Cervical back: Normal range of motion and neck supple.   Neurological:      Mental Status: She is alert.      Result Review :   The following data was reviewed by: ALEXANDRE Reese on 03/25/2022:  Common labs          1/16/2023    12:31 4/20/2023    11:24 8/9/2023    09:44   Common Labs   WBC 5.6     5.6     5.5       Hemoglobin 13.4     13.3     13.3       Hematocrit 40.4     39.5     39.6       Platelets 278     293     280          Details          This result is from an external source.                       Assessment and Plan    Diagnoses and all orders for this visit:    1. Graves disease (Primary)  -     TSH; Standing  -     T4, Free; Standing  -     T3; Standing  -     CBC & Differential; Standing  -     Comprehensive Metabolic Panel; Standing  -     TSH; Future  -     T4, Free; Future  -     T3; Future    2. Hyperthyroidism  -     TSH; Standing  -     T4, Free; Standing  -     T3; Standing  -     CBC & Differential; Standing  -     Comprehensive Metabolic Panel; Standing  -     TSH; Future  -     T4, Free; Future  -     T3; " Future    3. Anxiety  -     TSH; Standing  -     T4, Free; Standing  -     T3; Standing  -     CBC & Differential; Standing  -     Comprehensive Metabolic Panel; Standing  -     TSH; Future  -     T4, Free; Future  -     T3; Future    4. Vitamin D deficiency  -     TSH; Standing  -     T4, Free; Standing  -     T3; Standing  -     CBC & Differential; Standing  -     Comprehensive Metabolic Panel; Standing  -     TSH; Future  -     T4, Free; Future  -     T3; Future           Thyroid Health      Hyperthyroidism due to Graves         Component      Latest Ref Rng & Units 1/28/2022   Thyroid Peroxidase Antibody      0 - 34 IU/mL <8   Thyroid Stimulating Immunoglobulin      0.00 - 0.55 IU/L 1.50 (H)   Thyrotropin Receptor Antibody      0.00 - 1.75 IU/L 2.55 (H)         Discussed treatment for hyperthyroid      Including treatment with methimazole      Component      Latest Ref Rng 4/20/2023   TSH, High Sensitivity      0.55 - 4.78 uIU/ML 2.91 (E)              Component      Latest Ref Rng 4/20/2023   Free T4      0.80 - 1.76 NG/DL 1.17 (E)      (E) External lab result           Taking 2.5 mg methimazole --hold for two weeks        Component      Latest Ref Rng 8/9/2023   Free T4      0.80 - 1.76 NG/DL 1.13 (E)      (E) External lab result      T3  Order: [id number]  Component  Ref Range & Units 8 d ago   T3 Total  0.60 - 1.81 NG/ML 0.90        Component      Latest Ref Rng 8/9/2023   TSH, High Sensitivity      0.55 - 4.78 uIU/ML 3.22 (E)      (E) External lab result     Preventive Care:      Non smoker        Anxiety       Dramatically improvement         Bone health     Vitamin d def.     Taking supplement                 Follow Up   Return in about 3 months (around 11/17/2023) for Recheck, labs one week prior to next appointment.  Patient was given instructions and counseling regarding her condition or for health maintenance advice. Please see specific information pulled into the AVS if appropriate.         This  document has been electronically signed by ALEXANDRE Reese on August 17, 2023 09:18 CDT.